# Patient Record
Sex: MALE | Race: OTHER | NOT HISPANIC OR LATINO | URBAN - METROPOLITAN AREA
[De-identification: names, ages, dates, MRNs, and addresses within clinical notes are randomized per-mention and may not be internally consistent; named-entity substitution may affect disease eponyms.]

---

## 2020-04-04 ENCOUNTER — INPATIENT (INPATIENT)
Facility: HOSPITAL | Age: 85
LOS: 2 days | Discharge: NURSING, REHAB OR LONG-TERM CARE | End: 2020-04-07
Attending: INTERNAL MEDICINE | Admitting: INTERNAL MEDICINE
Payer: COMMERCIAL

## 2020-04-04 VITALS
RESPIRATION RATE: 18 BRPM | HEART RATE: 72 BPM | SYSTOLIC BLOOD PRESSURE: 131 MMHG | OXYGEN SATURATION: 97 % | DIASTOLIC BLOOD PRESSURE: 72 MMHG | TEMPERATURE: 99 F

## 2020-04-04 LAB
ALBUMIN SERPL ELPH-MCNC: 3.9 G/DL — SIGNIFICANT CHANGE UP (ref 3.5–5.2)
ALP SERPL-CCNC: 87 U/L — SIGNIFICANT CHANGE UP (ref 30–115)
ALT FLD-CCNC: 18 U/L — SIGNIFICANT CHANGE UP (ref 0–41)
ANION GAP SERPL CALC-SCNC: 15 MMOL/L — HIGH (ref 7–14)
APPEARANCE UR: CLEAR — SIGNIFICANT CHANGE UP
AST SERPL-CCNC: 23 U/L — SIGNIFICANT CHANGE UP (ref 0–41)
BASOPHILS # BLD AUTO: 0.01 K/UL — SIGNIFICANT CHANGE UP (ref 0–0.2)
BASOPHILS NFR BLD AUTO: 0.2 % — SIGNIFICANT CHANGE UP (ref 0–1)
BILIRUB SERPL-MCNC: 0.4 MG/DL — SIGNIFICANT CHANGE UP (ref 0.2–1.2)
BILIRUB UR-MCNC: NEGATIVE — SIGNIFICANT CHANGE UP
BUN SERPL-MCNC: 25 MG/DL — HIGH (ref 10–20)
CALCIUM SERPL-MCNC: 8.2 MG/DL — LOW (ref 8.5–10.1)
CHLORIDE SERPL-SCNC: 101 MMOL/L — SIGNIFICANT CHANGE UP (ref 98–110)
CO2 SERPL-SCNC: 21 MMOL/L — SIGNIFICANT CHANGE UP (ref 17–32)
COLOR SPEC: YELLOW — SIGNIFICANT CHANGE UP
CREAT SERPL-MCNC: 1.6 MG/DL — HIGH (ref 0.7–1.5)
DIFF PNL FLD: SIGNIFICANT CHANGE UP
EOSINOPHIL # BLD AUTO: 0 K/UL — SIGNIFICANT CHANGE UP (ref 0–0.7)
EOSINOPHIL NFR BLD AUTO: 0 % — SIGNIFICANT CHANGE UP (ref 0–8)
GLUCOSE SERPL-MCNC: 126 MG/DL — HIGH (ref 70–99)
GLUCOSE UR QL: NEGATIVE — SIGNIFICANT CHANGE UP
HCT VFR BLD CALC: 38.4 % — LOW (ref 42–52)
HGB BLD-MCNC: 12.9 G/DL — LOW (ref 14–18)
IMM GRANULOCYTES NFR BLD AUTO: 0.2 % — SIGNIFICANT CHANGE UP (ref 0.1–0.3)
KETONES UR-MCNC: NEGATIVE — SIGNIFICANT CHANGE UP
LACTATE SERPL-SCNC: 1.8 MMOL/L — SIGNIFICANT CHANGE UP (ref 0.7–2)
LEUKOCYTE ESTERASE UR-ACNC: NEGATIVE — SIGNIFICANT CHANGE UP
LYMPHOCYTES # BLD AUTO: 0.48 K/UL — LOW (ref 1.2–3.4)
LYMPHOCYTES # BLD AUTO: 11.5 % — LOW (ref 20.5–51.1)
MCHC RBC-ENTMCNC: 30.6 PG — SIGNIFICANT CHANGE UP (ref 27–31)
MCHC RBC-ENTMCNC: 33.6 G/DL — SIGNIFICANT CHANGE UP (ref 32–37)
MCV RBC AUTO: 91 FL — SIGNIFICANT CHANGE UP (ref 80–94)
MONOCYTES # BLD AUTO: 0.45 K/UL — SIGNIFICANT CHANGE UP (ref 0.1–0.6)
MONOCYTES NFR BLD AUTO: 10.8 % — HIGH (ref 1.7–9.3)
NEUTROPHILS # BLD AUTO: 3.22 K/UL — SIGNIFICANT CHANGE UP (ref 1.4–6.5)
NEUTROPHILS NFR BLD AUTO: 77.3 % — HIGH (ref 42.2–75.2)
NITRITE UR-MCNC: NEGATIVE — SIGNIFICANT CHANGE UP
NRBC # BLD: 0 /100 WBCS — SIGNIFICANT CHANGE UP (ref 0–0)
NT-PROBNP SERPL-SCNC: 395 PG/ML — HIGH (ref 0–300)
PH UR: 6 — SIGNIFICANT CHANGE UP (ref 5–8)
PLATELET # BLD AUTO: 130 K/UL — SIGNIFICANT CHANGE UP (ref 130–400)
POTASSIUM SERPL-MCNC: 4 MMOL/L — SIGNIFICANT CHANGE UP (ref 3.5–5)
POTASSIUM SERPL-SCNC: 4 MMOL/L — SIGNIFICANT CHANGE UP (ref 3.5–5)
PROT SERPL-MCNC: 5.9 G/DL — LOW (ref 6–8)
PROT UR-MCNC: ABNORMAL
RBC # BLD: 4.22 M/UL — LOW (ref 4.7–6.1)
RBC # FLD: 13.7 % — SIGNIFICANT CHANGE UP (ref 11.5–14.5)
SODIUM SERPL-SCNC: 137 MMOL/L — SIGNIFICANT CHANGE UP (ref 135–146)
SP GR SPEC: 1.02 — SIGNIFICANT CHANGE UP (ref 1.01–1.02)
TROPONIN T SERPL-MCNC: 0.02 NG/ML — HIGH
UROBILINOGEN FLD QL: SIGNIFICANT CHANGE UP
WBC # BLD: 4.17 K/UL — LOW (ref 4.8–10.8)
WBC # FLD AUTO: 4.17 K/UL — LOW (ref 4.8–10.8)

## 2020-04-04 PROCEDURE — 99285 EMERGENCY DEPT VISIT HI MDM: CPT

## 2020-04-04 PROCEDURE — 93010 ELECTROCARDIOGRAM REPORT: CPT

## 2020-04-04 PROCEDURE — 71045 X-RAY EXAM CHEST 1 VIEW: CPT | Mod: 26

## 2020-04-04 RX ORDER — ATORVASTATIN CALCIUM 80 MG/1
1 TABLET, FILM COATED ORAL
Qty: 0 | Refills: 0 | DISCHARGE

## 2020-04-04 RX ORDER — FLUTICASONE FUROATE AND VILANTEROL TRIFENATATE 100; 25 UG/1; UG/1
1 POWDER RESPIRATORY (INHALATION)
Qty: 0 | Refills: 0 | DISCHARGE

## 2020-04-04 RX ORDER — ACETAMINOPHEN 500 MG
650 TABLET ORAL ONCE
Refills: 0 | Status: COMPLETED | OUTPATIENT
Start: 2020-04-04 | End: 2020-04-04

## 2020-04-04 RX ADMIN — Medication 650 MILLIGRAM(S): at 20:25

## 2020-04-04 NOTE — H&P ADULT - NSHPLABSRESULTS_GEN_ALL_CORE
Complete Blood Count + Automated Diff (04.04.20 @ 20:50)    WBC Count: 4.17 K/uL    RBC Count: 4.22 M/uL    Hemoglobin: 12.9 g/dL    Hematocrit: 38.4 %    Mean Cell Volume: 91.0 fL    Mean Cell Hemoglobin: 30.6 pg    Mean Cell Hemoglobin Conc: 33.6 g/dL    Red Cell Distrib Width: 13.7 %    Platelet Count - Automated: 130 K/uL    Auto Neutrophil #: 3.22 K/uL    Auto Lymphocyte #: 0.48 K/uL    Auto Monocyte #: 0.45 K/uL    Auto Eosinophil #: 0.00 K/uL    Auto Basophil #: 0.01 K/uL    Auto Neutrophil %: 77.3: Differential percentages must be correlated with absolute numbers for  clinical significance. %    Auto Lymphocyte %: 11.5 %    Auto Monocyte %: 10.8 %    Auto Eosinophil %: 0.0 %    Auto Basophil %: 0.2 %    Auto Immature Granulocyte %: 0.2 %    Nucleated RBC: 0 /100 WBCs      Comprehensive Metabolic Panel (04.04.20 @ 20:50)    Sodium, Serum: 137 mmol/L    Potassium, Serum: 4.0 mmol/L    Chloride, Serum: 101 mmol/L    Carbon Dioxide, Serum: 21 mmol/L    Anion Gap, Serum: 15 mmol/L    Blood Urea Nitrogen, Serum: 25 mg/dL    Creatinine, Serum: 1.6 mg/dL    Glucose, Serum: 126 mg/dL    Calcium, Total Serum: 8.2 mg/dL    Protein Total, Serum: 5.9 g/dL    Albumin, Serum: 3.9 g/dL    Bilirubin Total, Serum: 0.4 mg/dL    Alkaline Phosphatase, Serum: 87 U/L    Aspartate Aminotransferase (AST/SGOT): 23 U/L    Alanine Aminotransferase (ALT/SGPT): 18 U/L    eGFR if Non : 37: Interpretative comment  The units for eGFR are mL/min/1.73M2 (normalized body surface area). The  eGFR is calculated from a serum creatinine using the CKD-EPI equation.  Other variables required for calculation are race, age and sex. Among  patients with chronic kidney disease (CKD), the eGFR is useful in  determining the stage of disease according to KDOQI CKD classification.  All eGFR results are reported numerically with the following  interpretation.          GFR                    With                 Without     (ml/min/1.73 m2)    Kidney Damage       Kidney Damage        >= 90                    Stage 1                     Normal        60-89                    Stage 2                     Decreased GFR        30-59     Stage 3                     Stage 3        15-29                    Stage 4                     Stage 4        < 15                      Stage 5                     Stage 5  Each stage of CKD assumes that the associated GFR level has been in  effect for at least 3 months. Determination of stages one and two (with  eGFR > 59 ml/min/m2) requires estimation of kidney damage for at least 3  months as defined by structural or functional abnormalities.  Limitations: All estimates of GFR will be less accurate for patients at  extremes of muscle mass (including but not limited to frail elderly,  critically ill, or cancer patients), those with unusual diets, and those  with conditions associated with reduced secretion or extrarenal  elimination of creatinine. The eGFR equation is not recommended for use  in patients with unstable creatinine levels. mL/min/1.73M2    eGFR if African American: 43 mL/min/1.73M2      < from: Xray Chest 1 View-PORTABLE IMMEDIATE (04.04.20 @ 21:13) >    Impression:      Low lung volumes with bilateral reticular and interstitial opacities.    < end of copied text >

## 2020-04-04 NOTE — ED PROVIDER NOTE - ATTENDING CONTRIBUTION TO CARE
Patient presents with one week h/o cough/congestion, generalized weakness and unable to get up from the bed and ambulate due to generalized weakness, patient lives alone in the assisted living facility, was independent prior to his symptoms one week ago.   vitals noted  lungs: B/L CTA  abd: +BS, NT, ND, soft  A/P: URI with generalized weakness  ambulation failure due to generalized weakness  labs, CXR, EKG  reevaluation

## 2020-04-04 NOTE — H&P ADULT - ATTENDING COMMENTS
A 89 yo male with PMH of HTN, HLD, COPD was sent to ED from senior resident for SOB. Patient reports cough and SOB for the last month. He was transferred from [facility] in [place] to [place] one month ago and since then he feels weak. yesterday he was getting SOB and cough, the staff noticed him unable to complete words from dyspnea so EMS was called.   in the ED patient is hemodynamically stable, SaO2 97% on room air. CXR showed bilateral interstitial infiltrates.     PHYSICAL EXAM:  GENERAL: NAD, well-developed  HEAD:  Atraumatic, Normocephalic  EYES: EOMI, PERRLA, conjunctiva and sclera clear  NECK: Supple, No JVD  CHEST/LUNG: bibasilar rales.   HEART: Regular rate and rhythm; S1 S2  ABDOMEN: Soft, Nontender, Nondistended; Bowel sounds present  EXTREMITIES:  2+ Peripheral Pulses, No clubbing, cyanosis, or edema  PSYCH: AAOx3  NEUROLOGY: non-focal    A/P:   Pneumonia, likely viral, to rule out COVID-19  CXR showed low lung volume, bilateral lower lung reticular and interstitial infiltrates.  No hypoxia.   Start on Plaquenil. QTc 430.  Patient is DNR/DNI    SHADIA:   Cr improved from 1.6 to 1.3  Encourage oral hydration. Hold IV fluid.     COPD: stable, no wheezing    HTN: elevated  Continue Metoprolol. Can resume torsemide tomorrow.   Will give on dose Amlodipine 5mg today.

## 2020-04-04 NOTE — ED PROVIDER NOTE - CARE PLAN
Principal Discharge DX:	Suspected 2019 novel coronavirus infection  Secondary Diagnosis:	Cough  Secondary Diagnosis:	Shortness of breath

## 2020-04-04 NOTE — H&P ADULT - ASSESSMENT
# Cough and reported fever most likely secondary to viral illness, rule out COVID19:  - in the ED patient is hemodynamically stable, SaO2 94$ on 2 L NC  - WBC 4.17, absolute lymphocyte count 0.48, lactate 2.2  - pending COVID PCR  - CXR with bilateral reticular and interstitial opacities.  - pending CRP and procalcitonin  - start hydroxychloroquine, QTC on prior     # HTN: - controlled     dvt ppx: Lovenox  gi ppx: not indicated  DASH-CHO consistent diet  from home 89 year old male w hx of HTN, HLD, COPD - not on home oxygen - is presenting to ED for evaluation of cough and dyspnea.    # Cough and Dyspnea fever most likely secondary to viral illness, rule out COVID19:  - in the ED patient is hemodynamically stable, SaO2 95% on room air   - WBC 4.17, absolute lymphocyte count 0.48, lactate 2.2  - pending COVID PCR  - CXR with bilateral reticular and interstitial opacities.  - pending CRP and procalcitonin    # COPD:  # DLD:  # HTN: - controlled     dvt ppx: Lovenox  gi ppx: not indicated  DASH-CHO consistent diet  from home 89 year old male w hx of HTN, HLD, COPD - not on home oxygen - is presenting to ED for evaluation of cough and dyspnea.    # Cough and Dyspnea fever most likely secondary to viral illness, rule out COVID19:  - in the ED patient is hemodynamically stable, SaO2 95% on room air   - WBC 4.17, absolute lymphocyte count 0.48, lactate 2.2  - pending COVID PCR  - CXR with bilateral reticular and interstitial opacities.  - pending CRP and procalcitonin    # COPD: - c/w Symbicort and montelukast   # DLD: - c/w statin   # HTN: - controlled, c/w  metoprolol  # Glucoma: - c/w eye drops   # GERD: c/w PPI      dvt ppx: Lovenox  gi ppx: PPI  DASH diet  from assisted living facility 89 year old male w hx of HTN, HLD, COPD - not on home oxygen - is presenting to ED for evaluation of cough and dyspnea.    # Cough and Dyspnea fever most likely secondary to viral illness, rule out COVID19:  - in the ED patient is hemodynamically stable, SaO2 95% on room air   - WBC 4.17, absolute lymphocyte count 0.48, lactate 2.2  - pending COVID PCR  - CXR with bilateral reticular and interstitial opacities.  - pending CRP and procalcitonin  - start hydroxychloroquine , follow up EKG for QTC    # COPD: - c/w Symbicort and montelukast   # DLD: - c/w statin   # HTN: - controlled, c/w metoprolol  # Glucoma: - c/w eye drops   # GERD: c/w PPI      * patient will be able to confirm the medication list in the AM, please follow     dvt ppx: Lovenox  gi ppx: PPI  DASH diet  from assisted living facility  DNR/DNI , MOLST in the chart to be signed by the attending 89 year old male w hx of HTN, HLD, COPD - not on home oxygen - is presenting to ED for evaluation of cough and dyspnea.    # Cough and Dyspnea fever most likely secondary to viral illness, rule out COVID19:  - in the ED patient is hemodynamically stable, SaO2 95% on room air   - WBC 4.17, absolute lymphocyte count 0.48, lactate 2.2  - pending COVID PCR  - CXR with bilateral reticular and interstitial opacities.  - pending CRP and procalcitonin  - start hydroxychloroquine , follow up EKG for QTC    # SHADIA vs CKD: - creatinine 1.6 on admission, will give trial of fluid and get urine studies if no improvement   # COPD: - c/w Symbicort and montelukast   # DLD: - c/w statin   # HTN: - controlled, c/w metoprolol  # Glucoma: - c/w eye drops   # GERD: c/w PPI      * patient will be able to confirm the medication list in the AM, please follow     dvt ppx: Lovenox  gi ppx: PPI  DASH diet  from assisted living facility  DNR/DNI , MOLST in the chart to be signed by the attending

## 2020-04-04 NOTE — ED PROVIDER NOTE - OBJECTIVE STATEMENT
89 year old male w hx of HTN, HLD, COPD, former smoker presents to the ED for evaluation of constant, moderate, progressively worsening generalized weakness x 1 month, worse over the last week. Pt states he was previously able to ambulate w/o assistive devices, now is unable to get out of bed w/o assistance. Also endorses 1 week of nonproductive cough and nonbloody watery diarrhea. Denies fevers/chills, URI sx, shortness of breath, wheezing, abd pain, n/v, black/bloody stools, irritative voiding symptoms, recent travel/sick contacts.

## 2020-04-04 NOTE — H&P ADULT - HISTORY OF PRESENT ILLNESS
89 year old male w hx of HTN, HLD, COPD, former smoker presents to the ED for evaluation of constant, moderate, progressively worsening generalized weakness x 1 month, worse over the last week. Pt states he was previously able to ambulate w/o assistive devices, now is unable to get out of bed w/o assistance. Also endorses 1 week of nonproductive cough and nonbloody watery diarrhea. Denies fevers/chills, URI sx, shortness of breath, wheezing, abd pain, n/v, black/bloody stools, irritative voiding symptoms, recent travel/sick contacts 89 year old male w hx of HTN, HLD, COPD - not on home oxygen - is presenting to ED for evaluation of cough and fever. patient endorses it started 5 days ago with fever Tmax 104 F , chills, non productive cough, myalgia and fatigue. patient denies headache, chest pain, palpitations, nausea, vomiting, diarrhea , or change in bowel or urinary habits. denies sick contacts or recent travel.     in the ED patient is hemodynamically stable, SaO2 97% on room air.   patient is being ruled out for VIOD19.  admitted to medicine for further management. 89 year old male w hx of HTN, HLD, COPD - not on home oxygen - is presenting to ED for evaluation of cough and dyspnea. patient endorses it started last week with non productive cough and progressive fatigue. patient denies headache, chest pain, palpitations, nausea, vomiting, diarrhea , or change in bowel or urinary habits. denies sick contacts or recent travel.     in the ED patient is hemodynamically stable, SaO2 97% on room air.   patient is being ruled out for VIOD19.  admitted to medicine for further management.

## 2020-04-04 NOTE — H&P ADULT - NSHPPHYSICALEXAM_GEN_ALL_CORE
GENERAL: NAD, lying in bed comfortably  HEAD:  Atraumatic, Normocephalic  EYES: EOMI, PERRLA, conjunctiva and sclera clear  ENT: Moist mucous membranes  NECK: Supple, No JVD  CHEST/LUNG: Clear to auscultation bilaterally; No rales, rhonchi, wheezing, or rubs. Unlabored respirations  HEART: Regular rate and rhythm; No murmurs, rubs, or gallops  ABDOMEN: Bowel sounds present; Soft, Nontender, Nondistended. No hepatomegally  EXTREMITIES:  2+ Peripheral Pulses, brisk capillary refill. No clubbing, cyanosis, or edema  NERVOUS SYSTEM:  Alert & Oriented X3, speech clear. No deficits   MSK: FROM all 4 extremities, full and equal strength  SKIN: No rashes or lesions GENERAL: NAD, lying in bed comfortably  CHEST/LUNG: Clear to auscultation bilaterally; No rales, rhonchi, wheezing, or rubs.   HEART: Regular rate and rhythm; No murmurs, rubs, or gallops  ABDOMEN: Bowel sounds present; Soft, Nontender, Nondistended.   EXTREMITIES:  2+ Peripheral Pulses, brisk capillary refill. No clubbing, cyanosis, or edema  NERVOUS SYSTEM:  Alert & Oriented X3, speech clear. No deficits

## 2020-04-04 NOTE — ED PROVIDER NOTE - PHYSICAL EXAMINATION
VITALS:  I have reviewed the initial vital signs.  GENERAL: Well-developed, well-nourished elderly male, in no acute distress. Nontoxic.  HEENT: Sclera clear. No conjunctival pallor. EOMI, PERRLA. Mucous membranes moist,.  NECK: supple w FROM. No JVD.  CARDIO: RRR, nl S1 and S2. No murmurs, rubs, or gallops. 2+ radial pulses bilaterally.  PULM: Normal effort. No tachypnea or retractions. No stridor. CTA b/l without wheezes, rales, or rhonchi.  MSK: FROM to extremities x4.  GI: Normal bowel sounds. Abdomen soft and non-distended. Nontender in all four quadrants without rebound or guarding.   : No CVA tenderness b/l.  SKIN: Warm, dry. No pallor. No jaundice.   NEURO: A&Ox3. Speech clear. No gross motor/sensory deficits.

## 2020-04-04 NOTE — ED PROVIDER NOTE - NS ED ROS FT
CONSTITUTIONAL: (-) fevers, (-) chills, (-) malaise, (-) decreased appetite  ENT: (-) congestion, (-) rhinorrhea, (-) sore throat  CARDIO: (-) chest pain, (-) palpitations, (-) edema, (-) cyanosis  PULM: (+) cough, (-) sputum, (-) chest tightness, (-) shortness of breath, (-) wheezing  GI: (-) nausea, (-) vomiting, (+) diarrhea, (-) constipation, (-) abdominal pain, (-) melena, (-) hematochezia, (-) rectal bleeding  : (-) dysuria, (-) hematuria, (-) frequency, (-) urgency, (-) flank pain  MSK: (-) back pain, (-) myalgias, (+) gait difficulty  SKIN: (-) rashes, (-) pallor  NEURO: (-) headache, (-) dizziness, (-) lightheadedness, (+) generalized weakness    *all other systems negative except as documented above and in the HPI*

## 2020-04-05 LAB
ANION GAP SERPL CALC-SCNC: 13 MMOL/L — SIGNIFICANT CHANGE UP (ref 7–14)
BACTERIA # UR AUTO: NEGATIVE — SIGNIFICANT CHANGE UP
BASOPHILS # BLD AUTO: 0.01 K/UL — SIGNIFICANT CHANGE UP (ref 0–0.2)
BASOPHILS NFR BLD AUTO: 0.3 % — SIGNIFICANT CHANGE UP (ref 0–1)
BUN SERPL-MCNC: 24 MG/DL — HIGH (ref 10–20)
CALCIUM SERPL-MCNC: 7.9 MG/DL — LOW (ref 8.5–10.1)
CHLORIDE SERPL-SCNC: 107 MMOL/L — SIGNIFICANT CHANGE UP (ref 98–110)
CO2 SERPL-SCNC: 22 MMOL/L — SIGNIFICANT CHANGE UP (ref 17–32)
CREAT SERPL-MCNC: 1.3 MG/DL — SIGNIFICANT CHANGE UP (ref 0.7–1.5)
EOSINOPHIL # BLD AUTO: 0.01 K/UL — SIGNIFICANT CHANGE UP (ref 0–0.7)
EOSINOPHIL NFR BLD AUTO: 0.3 % — SIGNIFICANT CHANGE UP (ref 0–8)
EPI CELLS # UR: 3 /HPF — SIGNIFICANT CHANGE UP (ref 0–5)
GLUCOSE SERPL-MCNC: 112 MG/DL — HIGH (ref 70–99)
HCT VFR BLD CALC: 38.2 % — LOW (ref 42–52)
HGB BLD-MCNC: 12.9 G/DL — LOW (ref 14–18)
HYALINE CASTS # UR AUTO: 6 /LPF — SIGNIFICANT CHANGE UP (ref 0–7)
IMM GRANULOCYTES NFR BLD AUTO: 0.3 % — SIGNIFICANT CHANGE UP (ref 0.1–0.3)
LYMPHOCYTES # BLD AUTO: 0.51 K/UL — LOW (ref 1.2–3.4)
LYMPHOCYTES # BLD AUTO: 14.6 % — LOW (ref 20.5–51.1)
MAGNESIUM SERPL-MCNC: 2.3 MG/DL — SIGNIFICANT CHANGE UP (ref 1.8–2.4)
MCHC RBC-ENTMCNC: 31.1 PG — HIGH (ref 27–31)
MCHC RBC-ENTMCNC: 33.8 G/DL — SIGNIFICANT CHANGE UP (ref 32–37)
MCV RBC AUTO: 92 FL — SIGNIFICANT CHANGE UP (ref 80–94)
MONOCYTES # BLD AUTO: 0.35 K/UL — SIGNIFICANT CHANGE UP (ref 0.1–0.6)
MONOCYTES NFR BLD AUTO: 10 % — HIGH (ref 1.7–9.3)
NEUTROPHILS # BLD AUTO: 2.61 K/UL — SIGNIFICANT CHANGE UP (ref 1.4–6.5)
NEUTROPHILS NFR BLD AUTO: 74.5 % — SIGNIFICANT CHANGE UP (ref 42.2–75.2)
NRBC # BLD: 0 /100 WBCS — SIGNIFICANT CHANGE UP (ref 0–0)
PLATELET # BLD AUTO: 114 K/UL — LOW (ref 130–400)
POTASSIUM SERPL-MCNC: 4.1 MMOL/L — SIGNIFICANT CHANGE UP (ref 3.5–5)
POTASSIUM SERPL-SCNC: 4.1 MMOL/L — SIGNIFICANT CHANGE UP (ref 3.5–5)
RBC # BLD: 4.15 M/UL — LOW (ref 4.7–6.1)
RBC # FLD: 13.9 % — SIGNIFICANT CHANGE UP (ref 11.5–14.5)
RBC CASTS # UR COMP ASSIST: 9 /HPF — HIGH (ref 0–4)
SARS-COV-2 RNA SPEC QL NAA+PROBE: DETECTED
SODIUM SERPL-SCNC: 142 MMOL/L — SIGNIFICANT CHANGE UP (ref 135–146)
WBC # BLD: 3.5 K/UL — LOW (ref 4.8–10.8)
WBC # FLD AUTO: 3.5 K/UL — LOW (ref 4.8–10.8)
WBC UR QL: 2 /HPF — SIGNIFICANT CHANGE UP (ref 0–5)

## 2020-04-05 PROCEDURE — 99223 1ST HOSP IP/OBS HIGH 75: CPT | Mod: AI

## 2020-04-05 RX ORDER — HYDROXYCHLOROQUINE SULFATE 200 MG
400 TABLET ORAL EVERY 12 HOURS
Refills: 0 | Status: COMPLETED | OUTPATIENT
Start: 2020-04-05 | End: 2020-04-05

## 2020-04-05 RX ORDER — ATORVASTATIN CALCIUM 80 MG/1
40 TABLET, FILM COATED ORAL DAILY
Refills: 0 | Status: DISCONTINUED | OUTPATIENT
Start: 2020-04-05 | End: 2020-04-07

## 2020-04-05 RX ORDER — MONTELUKAST 4 MG/1
1 TABLET, CHEWABLE ORAL
Qty: 0 | Refills: 0 | DISCHARGE

## 2020-04-05 RX ORDER — MONTELUKAST 4 MG/1
10 TABLET, CHEWABLE ORAL DAILY
Refills: 0 | Status: DISCONTINUED | OUTPATIENT
Start: 2020-04-05 | End: 2020-04-07

## 2020-04-05 RX ORDER — LATANOPROST 0.05 MG/ML
1 SOLUTION/ DROPS OPHTHALMIC; TOPICAL
Qty: 0 | Refills: 0 | DISCHARGE

## 2020-04-05 RX ORDER — HYDROXYCHLOROQUINE SULFATE 200 MG
200 TABLET ORAL EVERY 12 HOURS
Refills: 0 | Status: DISCONTINUED | OUTPATIENT
Start: 2020-04-06 | End: 2020-04-07

## 2020-04-05 RX ORDER — BUDESONIDE AND FORMOTEROL FUMARATE DIHYDRATE 160; 4.5 UG/1; UG/1
2 AEROSOL RESPIRATORY (INHALATION)
Refills: 0 | Status: DISCONTINUED | OUTPATIENT
Start: 2020-04-05 | End: 2020-04-07

## 2020-04-05 RX ORDER — PANTOPRAZOLE SODIUM 20 MG/1
40 TABLET, DELAYED RELEASE ORAL
Refills: 0 | Status: DISCONTINUED | OUTPATIENT
Start: 2020-04-05 | End: 2020-04-07

## 2020-04-05 RX ORDER — HEPARIN SODIUM 5000 [USP'U]/ML
5000 INJECTION INTRAVENOUS; SUBCUTANEOUS EVERY 8 HOURS
Refills: 0 | Status: DISCONTINUED | OUTPATIENT
Start: 2020-04-05 | End: 2020-04-07

## 2020-04-05 RX ORDER — METOPROLOL TARTRATE 50 MG
50 TABLET ORAL DAILY
Refills: 0 | Status: DISCONTINUED | OUTPATIENT
Start: 2020-04-05 | End: 2020-04-07

## 2020-04-05 RX ORDER — HYDROXYCHLOROQUINE SULFATE 200 MG
TABLET ORAL
Refills: 0 | Status: DISCONTINUED | OUTPATIENT
Start: 2020-04-05 | End: 2020-04-07

## 2020-04-05 RX ORDER — SODIUM CHLORIDE 9 MG/ML
1000 INJECTION INTRAMUSCULAR; INTRAVENOUS; SUBCUTANEOUS
Refills: 0 | Status: DISCONTINUED | OUTPATIENT
Start: 2020-04-05 | End: 2020-04-06

## 2020-04-05 RX ORDER — PANTOPRAZOLE SODIUM 20 MG/1
1 TABLET, DELAYED RELEASE ORAL
Qty: 0 | Refills: 0 | DISCHARGE

## 2020-04-05 RX ORDER — AMLODIPINE BESYLATE 2.5 MG/1
5 TABLET ORAL DAILY
Refills: 0 | Status: DISCONTINUED | OUTPATIENT
Start: 2020-04-05 | End: 2020-04-07

## 2020-04-05 RX ORDER — LATANOPROST 0.05 MG/ML
1 SOLUTION/ DROPS OPHTHALMIC; TOPICAL AT BEDTIME
Refills: 0 | Status: DISCONTINUED | OUTPATIENT
Start: 2020-04-05 | End: 2020-04-07

## 2020-04-05 RX ORDER — METOPROLOL TARTRATE 50 MG
1 TABLET ORAL
Qty: 0 | Refills: 0 | DISCHARGE

## 2020-04-05 RX ADMIN — Medication 400 MILLIGRAM(S): at 18:35

## 2020-04-05 RX ADMIN — HEPARIN SODIUM 5000 UNIT(S): 5000 INJECTION INTRAVENOUS; SUBCUTANEOUS at 06:12

## 2020-04-05 RX ADMIN — AMLODIPINE BESYLATE 5 MILLIGRAM(S): 2.5 TABLET ORAL at 12:58

## 2020-04-05 RX ADMIN — BUDESONIDE AND FORMOTEROL FUMARATE DIHYDRATE 2 PUFF(S): 160; 4.5 AEROSOL RESPIRATORY (INHALATION) at 08:04

## 2020-04-05 RX ADMIN — HEPARIN SODIUM 5000 UNIT(S): 5000 INJECTION INTRAVENOUS; SUBCUTANEOUS at 12:07

## 2020-04-05 RX ADMIN — PANTOPRAZOLE SODIUM 40 MILLIGRAM(S): 20 TABLET, DELAYED RELEASE ORAL at 06:12

## 2020-04-05 RX ADMIN — MONTELUKAST 10 MILLIGRAM(S): 4 TABLET, CHEWABLE ORAL at 12:27

## 2020-04-05 RX ADMIN — Medication 50 MILLIGRAM(S): at 06:12

## 2020-04-05 RX ADMIN — HEPARIN SODIUM 5000 UNIT(S): 5000 INJECTION INTRAVENOUS; SUBCUTANEOUS at 21:05

## 2020-04-05 RX ADMIN — LATANOPROST 1 DROP(S): 0.05 SOLUTION/ DROPS OPHTHALMIC; TOPICAL at 21:05

## 2020-04-05 RX ADMIN — Medication 400 MILLIGRAM(S): at 02:08

## 2020-04-05 RX ADMIN — ATORVASTATIN CALCIUM 40 MILLIGRAM(S): 80 TABLET, FILM COATED ORAL at 12:27

## 2020-04-05 RX ADMIN — BUDESONIDE AND FORMOTEROL FUMARATE DIHYDRATE 2 PUFF(S): 160; 4.5 AEROSOL RESPIRATORY (INHALATION) at 21:06

## 2020-04-05 RX ADMIN — SODIUM CHLORIDE 75 MILLILITER(S): 9 INJECTION INTRAMUSCULAR; INTRAVENOUS; SUBCUTANEOUS at 02:08

## 2020-04-06 LAB
ALBUMIN SERPL ELPH-MCNC: 3.6 G/DL — SIGNIFICANT CHANGE UP (ref 3.5–5.2)
ALP SERPL-CCNC: 87 U/L — SIGNIFICANT CHANGE UP (ref 30–115)
ALT FLD-CCNC: 17 U/L — SIGNIFICANT CHANGE UP (ref 0–41)
ANION GAP SERPL CALC-SCNC: 14 MMOL/L — SIGNIFICANT CHANGE UP (ref 7–14)
AST SERPL-CCNC: 28 U/L — SIGNIFICANT CHANGE UP (ref 0–41)
BASOPHILS # BLD AUTO: 0 K/UL — SIGNIFICANT CHANGE UP (ref 0–0.2)
BASOPHILS NFR BLD AUTO: 0 % — SIGNIFICANT CHANGE UP (ref 0–1)
BILIRUB SERPL-MCNC: 0.6 MG/DL — SIGNIFICANT CHANGE UP (ref 0.2–1.2)
BUN SERPL-MCNC: 19 MG/DL — SIGNIFICANT CHANGE UP (ref 10–20)
CALCIUM SERPL-MCNC: 8.5 MG/DL — SIGNIFICANT CHANGE UP (ref 8.5–10.1)
CHLORIDE SERPL-SCNC: 105 MMOL/L — SIGNIFICANT CHANGE UP (ref 98–110)
CO2 SERPL-SCNC: 23 MMOL/L — SIGNIFICANT CHANGE UP (ref 17–32)
CREAT SERPL-MCNC: 1.1 MG/DL — SIGNIFICANT CHANGE UP (ref 0.7–1.5)
CRP SERPL-MCNC: 6.64 MG/DL — HIGH (ref 0–0.4)
CULTURE RESULTS: NO GROWTH — SIGNIFICANT CHANGE UP
EOSINOPHIL # BLD AUTO: 0.07 K/UL — SIGNIFICANT CHANGE UP (ref 0–0.7)
EOSINOPHIL NFR BLD AUTO: 2 % — SIGNIFICANT CHANGE UP (ref 0–8)
GLUCOSE SERPL-MCNC: 104 MG/DL — HIGH (ref 70–99)
HCT VFR BLD CALC: 40.1 % — LOW (ref 42–52)
HGB BLD-MCNC: 13.5 G/DL — LOW (ref 14–18)
IMM GRANULOCYTES NFR BLD AUTO: 0.3 % — SIGNIFICANT CHANGE UP (ref 0.1–0.3)
LYMPHOCYTES # BLD AUTO: 0.59 K/UL — LOW (ref 1.2–3.4)
LYMPHOCYTES # BLD AUTO: 16.8 % — LOW (ref 20.5–51.1)
MAGNESIUM SERPL-MCNC: 2.2 MG/DL — SIGNIFICANT CHANGE UP (ref 1.8–2.4)
MCHC RBC-ENTMCNC: 30.6 PG — SIGNIFICANT CHANGE UP (ref 27–31)
MCHC RBC-ENTMCNC: 33.7 G/DL — SIGNIFICANT CHANGE UP (ref 32–37)
MCV RBC AUTO: 90.9 FL — SIGNIFICANT CHANGE UP (ref 80–94)
MONOCYTES # BLD AUTO: 0.33 K/UL — SIGNIFICANT CHANGE UP (ref 0.1–0.6)
MONOCYTES NFR BLD AUTO: 9.4 % — HIGH (ref 1.7–9.3)
NEUTROPHILS # BLD AUTO: 2.52 K/UL — SIGNIFICANT CHANGE UP (ref 1.4–6.5)
NEUTROPHILS NFR BLD AUTO: 71.5 % — SIGNIFICANT CHANGE UP (ref 42.2–75.2)
NRBC # BLD: 0 /100 WBCS — SIGNIFICANT CHANGE UP (ref 0–0)
PLATELET # BLD AUTO: 142 K/UL — SIGNIFICANT CHANGE UP (ref 130–400)
POTASSIUM SERPL-MCNC: 4.2 MMOL/L — SIGNIFICANT CHANGE UP (ref 3.5–5)
POTASSIUM SERPL-SCNC: 4.2 MMOL/L — SIGNIFICANT CHANGE UP (ref 3.5–5)
PROCALCITONIN SERPL-MCNC: 0.05 NG/ML — SIGNIFICANT CHANGE UP (ref 0.02–0.1)
PROT SERPL-MCNC: 6 G/DL — SIGNIFICANT CHANGE UP (ref 6–8)
RBC # BLD: 4.41 M/UL — LOW (ref 4.7–6.1)
RBC # FLD: 13.7 % — SIGNIFICANT CHANGE UP (ref 11.5–14.5)
SODIUM SERPL-SCNC: 142 MMOL/L — SIGNIFICANT CHANGE UP (ref 135–146)
SPECIMEN SOURCE: SIGNIFICANT CHANGE UP
WBC # BLD: 3.52 K/UL — LOW (ref 4.8–10.8)
WBC # FLD AUTO: 3.52 K/UL — LOW (ref 4.8–10.8)

## 2020-04-06 PROCEDURE — 99233 SBSQ HOSP IP/OBS HIGH 50: CPT

## 2020-04-06 RX ORDER — AMLODIPINE BESYLATE 2.5 MG/1
1 TABLET ORAL
Qty: 30 | Refills: 0
Start: 2020-04-06 | End: 2020-05-05

## 2020-04-06 RX ORDER — ACETAMINOPHEN 500 MG
650 TABLET ORAL EVERY 6 HOURS
Refills: 0 | Status: DISCONTINUED | OUTPATIENT
Start: 2020-04-06 | End: 2020-04-07

## 2020-04-06 RX ORDER — HYDROXYCHLOROQUINE SULFATE 200 MG
1 TABLET ORAL
Qty: 8 | Refills: 0
Start: 2020-04-06 | End: 2020-04-09

## 2020-04-06 RX ADMIN — AMLODIPINE BESYLATE 5 MILLIGRAM(S): 2.5 TABLET ORAL at 06:36

## 2020-04-06 RX ADMIN — Medication 50 MILLIGRAM(S): at 06:36

## 2020-04-06 RX ADMIN — Medication 200 MILLIGRAM(S): at 06:36

## 2020-04-06 RX ADMIN — ATORVASTATIN CALCIUM 40 MILLIGRAM(S): 80 TABLET, FILM COATED ORAL at 13:02

## 2020-04-06 RX ADMIN — MONTELUKAST 10 MILLIGRAM(S): 4 TABLET, CHEWABLE ORAL at 13:01

## 2020-04-06 RX ADMIN — Medication 650 MILLIGRAM(S): at 20:11

## 2020-04-06 RX ADMIN — BUDESONIDE AND FORMOTEROL FUMARATE DIHYDRATE 2 PUFF(S): 160; 4.5 AEROSOL RESPIRATORY (INHALATION) at 20:28

## 2020-04-06 RX ADMIN — PANTOPRAZOLE SODIUM 40 MILLIGRAM(S): 20 TABLET, DELAYED RELEASE ORAL at 06:37

## 2020-04-06 RX ADMIN — HEPARIN SODIUM 5000 UNIT(S): 5000 INJECTION INTRAVENOUS; SUBCUTANEOUS at 13:01

## 2020-04-06 RX ADMIN — Medication 0.5 MILLIGRAM(S): at 01:01

## 2020-04-06 RX ADMIN — BUDESONIDE AND FORMOTEROL FUMARATE DIHYDRATE 2 PUFF(S): 160; 4.5 AEROSOL RESPIRATORY (INHALATION) at 09:39

## 2020-04-06 RX ADMIN — HEPARIN SODIUM 5000 UNIT(S): 5000 INJECTION INTRAVENOUS; SUBCUTANEOUS at 06:36

## 2020-04-06 RX ADMIN — Medication 200 MILLIGRAM(S): at 17:14

## 2020-04-06 RX ADMIN — Medication 650 MILLIGRAM(S): at 09:38

## 2020-04-06 NOTE — PROGRESS NOTE ADULT - ASSESSMENT
A 89 yo male with PMH of HTN, HLD, COPD was sent to ED from senior resident for SOB. Patient reports cough and SOB for the last month. He was transferred from [facility] in [place] to [place] one month ago and since then he feels weak, yesterday he was getting SOB and cough, the staff noticed him unable to complete words from dyspnea so EMS was called.   in the ED patient is hemodynamically stable, SaO2 97% on room air. CXR showed bilateral interstitial infiltrates.     A/P:   Pneumonia, likely viral, to rule out COVID-19  CXR showed low lung volume, bilateral lower lung reticular and interstitial infiltrates.  No hypoxia.   Continue Plaquenil. QTc 430.  Patient is DNR/DNI    SHADIA:   Cr improved from 1.6 to 1.3  Encourage oral hydration. Hold IV fluid.     COPD: stable, Continue Symbicort and Singular.     HTN: elevated  Continue Metoprolol. Can resume torsemide.  I added Amlodipine 5mg daily.      #Progress Note Handoff:  Pending (specify):  Improving Hypoxia  Family discussion:  Disposition: Home possible today.

## 2020-04-06 NOTE — DISCHARGE NOTE PROVIDER - HOSPITAL COURSE
89 year old male w hx of HTN, HLD, COPD - not on home oxygen - is presenting to ED for evaluation of cough and dyspnea. patient endorses it started last week with non productive cough and progressive fatigue. patient denies headache, chest pain, palpitations, nausea, vomiting, diarrhea , or change in bowel or urinary habits. denies sick contacts or recent travel. In the ED patient is hemodynamically stable, SaO2 97% on room air. Admitted to medicine for further management. (04 Apr 2020 23:17)        Pt is admitted for cough, Dyspnea and Fever secondary to COVID19.  Sepsis is ruled out on admission. In the ED patient is hemodynamically stable, SaO2 95% on room air. COVID PCR positive w/ CXR with bilateral reticular and interstitial opacities. Pt is stable to be discharged to nursing home with home O2. Pt is to continue hydroxychloroquine (to end on 4/9/20), EKG on admission - . Pt also has SHADIA on admission w/ creatinine of 1.6 on admission, improved s/p IVF. Pt is asked to follow up with primary care team in 1-2 weeks.

## 2020-04-06 NOTE — DISCHARGE NOTE PROVIDER - NSDCCPCAREPLAN_GEN_ALL_CORE_FT
PRINCIPAL DISCHARGE DIAGNOSIS  Diagnosis: COVID-19  Assessment and Plan of Treatment: Stable. Medical management. You were admitted to the hospital because of COVID19 infection. You developed pneumonia from the infection and will benefit home oxygen to maintain SpO2 > 88%. Please take your medications as prescribed. Please continue your self-quarantine for 14 days. Please follow up with your primary care doctor in 14 days.      SECONDARY DISCHARGE DIAGNOSES  Diagnosis: SHADIA (acute kidney injury)  Assessment and Plan of Treatment: Stable. Medical management. You developed acute kidney injury likely from dehydration. It has now resolved from IV fluid. Please take your medications as prescribed. Please follow up with your primary care doctor in 14 days to reassess your kidney function.

## 2020-04-06 NOTE — DISCHARGE NOTE PROVIDER - NSDCCONDITION_GEN_ALL_CORE
Detail Level: Zone
Quality 226: Preventive Care And Screening: Tobacco Use: Screening And Cessation Intervention: Tobacco Screening not Performed for Unknown Reasons
Quality 131: Pain Assessment And Follow-Up: Pain assessment NOT documented as being performed, documentation the patient is not eligible for a pain assessment using a standardized tool
Quality 110: Preventive Care And Screening: Influenza Immunization: Influenza Immunization Administered during Influenza season
Quality 431: Preventive Care And Screening: Unhealthy Alcohol Use - Screening: Patient screened for unhealthy alcohol use using a single question and scores less than 2 times per year
Stable

## 2020-04-06 NOTE — ED ADULT NURSE REASSESSMENT NOTE - NS ED NURSE REASSESS COMMENT FT1
received pt from previous shift RN , pt confused , frequent orientation done , frequent nursing rounds done , provided safety , fall precautions applied , fall health teachings done , no labored breathing this time , no grimaced face noted , no vomiting , for transfer to nursing home as ordered , awaiting for transport

## 2020-04-06 NOTE — PROGRESS NOTE ADULT - ASSESSMENT
89 year old male w hx of HTN, HLD, COPD - not on home oxygen - is presenting to ED for evaluation of cough and dyspnea.    # Cough, Dyspnea and Fever secondary to COVID19   - no sepsis on admission   - in the ED patient is hemodynamically stable, SaO2 95% on room air   - COVID PCR positive   - CXR with bilateral reticular and interstitial opacities.  - pending CRP and procalcitonin result from 4/5/20  - c/w hydroxychloroquine (to end on 4/9/20), EKG on admission -     # SHADIA vs CKD: - creatinine 1.6 on admission, improved s/p IVF   # COPD: - c/w Symbicort and montelukast   # DLD: - c/w statin   # HTN: - controlled, c/w metoprolol and amlodipine   # Glucoma: - c/w eye drops   # GERD: c/w PPI      DVT ppx: Heparin 5000 q8h   GI ppx: PPI  Diet: DASH diet  Disposition: from assisted living facility  Goal status: DNR/DNI

## 2020-04-06 NOTE — DISCHARGE NOTE PROVIDER - NSDCMRMEDTOKEN_GEN_ALL_CORE_FT
atorvastatin 40 mg oral tablet: 1 tab(s) orally once a day  Breo Ellipta 200 mcg-25 mcg/inh inhalation powder: 1 puff(s) inhaled once a day  latanoprost 0.005% ophthalmic solution: 1 drop(s) to each affected eye once a day (in the evening)  metoprolol succinate 50 mg oral tablet, extended release: 1 tab(s) orally once a day  montelukast 10 mg oral tablet: 1 tab(s) orally once a day  pantoprazole 40 mg oral delayed release tablet: 1 tab(s) orally once a day

## 2020-04-06 NOTE — PROGRESS NOTE ADULT - SUBJECTIVE AND OBJECTIVE BOX
SARA MOHR  89y  Male      Patient is a 89y old  Male who presents with a chief complaint of SOB (2020 08:22)      INTERVAL HPI/OVERNIGHT EVENTS:  He feels ok, SOB improved.   Vital Signs Last 24 Hrs  T(C): 38.3 (2020 08:25), Max: 38.3 (2020 08:25)  T(F): 101 (2020 08:25), Max: 101 (2020 08:25)  HR: 81 (2020 11:09) (81 - 90)  BP: 128/66 (2020 11:09) (128/66 - 186/86)  BP(mean): --  RR: 18 (2020 11:09) (18 - 22)  SpO2: 94% (2020 11:09) (92% - 99%)      20 @ 07:01  -  20 @ 07:00  --------------------------------------------------------  IN: 120 mL / OUT: 300 mL / NET: -180 mL            Consultant(s) Notes Reviewed:  [x ] YES  [ ] NO          MEDICATIONS  (STANDING):  amLODIPine   Tablet 5 milliGRAM(s) Oral daily  atorvastatin Oral Tab/Cap - Peds 40 milliGRAM(s) Oral daily  budesonide 160 MICROgram(s)/formoterol 4.5 MICROgram(s) Inhaler 2 Puff(s) Inhalation two times a day  heparin  Injectable 5000 Unit(s) SubCutaneous every 8 hours  hydroxychloroquine   Oral   hydroxychloroquine 200 milliGRAM(s) Oral every 12 hours  latanoprost 0.005% Ophthalmic Solution 1 Drop(s) Both EYES at bedtime  metoprolol succinate ER 50 milliGRAM(s) Oral daily  montelukast 10 milliGRAM(s) Oral daily  pantoprazole    Tablet 40 milliGRAM(s) Oral before breakfast    MEDICATIONS  (PRN):  acetaminophen   Tablet .. 650 milliGRAM(s) Oral every 6 hours PRN Temp greater or equal to 38C (100.4F)      LABS                          12.9   3.50  )-----------( 114      ( 2020 06:42 )             38.2         142  |  107  |  24<H>  ----------------------------<  112<H>  4.1   |  22  |  1.3    Ca    7.9<L>      2020 06:42  Mg     2.3         TPro  5.9<L>  /  Alb  3.9  /  TBili  0.4  /  DBili  x   /  AST  23  /  ALT  18  /  AlkPhos  87        Urinalysis Basic - ( 2020 23:34 )    Color: Yellow / Appearance: Clear / S.023 / pH: x  Gluc: x / Ketone: Negative  / Bili: Negative / Urobili: <2 mg/dL   Blood: x / Protein: 30 mg/dL / Nitrite: Negative   Leuk Esterase: Negative / RBC: 9 /HPF / WBC 2 /HPF   Sq Epi: x / Non Sq Epi: 3 /HPF / Bacteria: Negative        Lactate Trend   @ 20:50 Lactate:1.8     CARDIAC MARKERS ( 2020 20:50 )  x     / 0.02 ng/mL / x     / x     / x          CAPILLARY BLOOD GLUCOSE          Culture - Urine (collected 20 @ 23:34)  Source: .Urine Clean Catch (Midstream)  Final Report (20 @ 08:59):    No growth        RADIOLOGY & ADDITIONAL TESTS:    Imaging Personally Reviewed:  [ ] YES  [ ] NO    HEALTH ISSUES - PROBLEM Dx:        PHYSICAL EXAM:  GENERAL: NAD, well-developed  HEAD:  Atraumatic, Normocephalic  EYES: EOMI, PERRLA, conjunctiva and sclera clear  NECK: Supple, No JVD  CHEST/LUNG: bibasilar rales.   HEART: Regular rate and rhythm; S1 S2  ABDOMEN: Soft, Nontender, Nondistended; Bowel sounds present  EXTREMITIES:  2+ Peripheral Pulses, No clubbing, cyanosis, or edema  PSYCH: AAOx3  NEUROLOGY: non-focal

## 2020-04-06 NOTE — DISCHARGE NOTE PROVIDER - CARE PROVIDER_API CALL
Kerwin Thurman (DO)  [facility]  [address] [place] [address]  Phone: [phone]  Fax: [phone]  Follow Up Time: 2 weeks

## 2020-04-06 NOTE — ED ADULT NURSE REASSESSMENT NOTE - NS ED NURSE REASSESS COMMENT FT1
Pt confused intermittently. MD [phone] made aware. Ativan PO ordered. Fall precautions maintained. BA in place. No emergent s/s of distress noted.

## 2020-04-06 NOTE — PROGRESS NOTE ADULT - SUBJECTIVE AND OBJECTIVE BOX
SARA MOHR 89y Male  MRN#: [id number]     SUBJECTIVE  Patient is a 89y old Male who presents with a chief complaint of SOB.	    Today is hospital day 2d, and this morning he is lying in bed without distress. On RA w/ SpO2 of 95%. Febrile of 101 today.   No acute overnight events.     OBJECTIVE  PAST MEDICAL & SURGICAL HISTORY    ALLERGIES:  No Known Allergies    MEDICATIONS:  STANDING MEDICATIONS  amLODIPine   Tablet 5 milliGRAM(s) Oral daily  atorvastatin Oral Tab/Cap - Peds 40 milliGRAM(s) Oral daily  budesonide 160 MICROgram(s)/formoterol 4.5 MICROgram(s) Inhaler 2 Puff(s) Inhalation two times a day  heparin  Injectable 5000 Unit(s) SubCutaneous every 8 hours  hydroxychloroquine   Oral   hydroxychloroquine 200 milliGRAM(s) Oral every 12 hours  latanoprost 0.005% Ophthalmic Solution 1 Drop(s) Both EYES at bedtime  metoprolol succinate ER 50 milliGRAM(s) Oral daily  montelukast 10 milliGRAM(s) Oral daily  pantoprazole    Tablet 40 milliGRAM(s) Oral before breakfast    PRN MEDICATIONS    HOME MEDICATIONS  Home Medications:  atorvastatin 40 mg oral tablet: 1 tab(s) orally once a day (2020 23:59)  Breo Ellipta 200 mcg-25 mcg/inh inhalation powder: 1 puff(s) inhaled once a day (2020 23:59)  latanoprost 0.005% ophthalmic solution: 1 drop(s) to each affected eye once a day (in the evening) (2020 00:00)  metoprolol succinate 50 mg oral tablet, extended release: 1 tab(s) orally once a day (2020 00:00)  montelukast 10 mg oral tablet: 1 tab(s) orally once a day (2020 00:00)  pantoprazole 40 mg oral delayed release tablet: 1 tab(s) orally once a day (2020 00:01)  torsemide 20 mg oral tablet: 1 tab(s) orally once a day (2020 00:01)      VITAL SIGNS: Last 24 Hours  T(C): 36.2 (2020 21:54), Max: 37.8 (2020 16:41)  T(F): 97.2 (2020 21:54), Max: 100 (2020 16:41)  HR: 87 (2020 05:32) (87 - 90)  BP: 173/3 (2020 05:32) (139/90 - 186/86)  BP(mean): --  RR: 20 (2020 05:32) (20 - 22)  SpO2: 99% (2020 05:32) (92% - 99%)    20 @ 07:01  -  - @ 07:00  --------------------------------------------------------  IN: 120 mL / OUT: 300 mL / NET: -180 mL        LABS:                        12.9   3.50  )-----------( 114      ( 2020 06:42 )             38.2     04-05    142  |  107  |  24<H>  ----------------------------<  112<H>  4.1   |  22  |  1.3    Ca    7.9<L>      2020 06:42  Mg     2.3     04-05    TPro  5.9<L>  /  Alb  3.9  /  TBili  0.4  /  DBili  x   /  AST  23  /  ALT  18  /  AlkPhos  87  04-04    LIVER FUNCTIONS - ( 2020 20:50 )  Alb: 3.9 g/dL / Pro: 5.9 g/dL / ALK PHOS: 87 U/L / ALT: 18 U/L / AST: 23 U/L / GGT: x             Urinalysis Basic - ( 2020 23:34 )    Color: Yellow / Appearance: Clear / S.023 / pH: x  Gluc: x / Ketone: Negative  / Bili: Negative / Urobili: <2 mg/dL   Blood: x / Protein: 30 mg/dL / Nitrite: Negative   Leuk Esterase: Negative / RBC: 9 /HPF / WBC 2 /HPF   Sq Epi: x / Non Sq Epi: 3 /HPF / Bacteria: Negative            CARDIAC MARKERS ( 2020 20:50 )  x     / 0.02 ng/mL / x     / x     / x          CAPILLARY BLOOD GLUCOSE          RADIOLOGY:    < from: Xray Chest 1 View-PORTABLE IMMEDIATE (20 @ 21:13) >  Impression:    Low lung volumes with bilateral reticular and interstitial opacities.  < end of copied text >      PHYSICAL EXAM:  GENERAL: NAD, AAO x 4, 89y M with difficulty hearing  HEAD:  Atraumatic, Normocephalic  EYES: EOMI, conjunctiva clear and sclera white  NECK: Supple, No JVD  CHEST/LUNG: On RA; No accessory muscles used  HEART: Regular rate and rhythm; No murmurs;   ABDOMEN: Soft, Nontender, Nondistended; No guarding  EXTREMITIES:  2+ Peripheral Pulses, No cyanosis or edema  NEUROLOGY: non-focal    ADMISSION SUMMARY  Patient is a 89y old Male who presents with a chief complaint of

## 2020-04-07 VITALS
OXYGEN SATURATION: 95 % | TEMPERATURE: 99 F | SYSTOLIC BLOOD PRESSURE: 129 MMHG | HEART RATE: 80 BPM | DIASTOLIC BLOOD PRESSURE: 73 MMHG | RESPIRATION RATE: 16 BRPM

## 2020-04-07 LAB
ALBUMIN SERPL ELPH-MCNC: 3.5 G/DL — SIGNIFICANT CHANGE UP (ref 3.5–5.2)
ALP SERPL-CCNC: 82 U/L — SIGNIFICANT CHANGE UP (ref 30–115)
ALT FLD-CCNC: 17 U/L — SIGNIFICANT CHANGE UP (ref 0–41)
ANION GAP SERPL CALC-SCNC: 13 MMOL/L — SIGNIFICANT CHANGE UP (ref 7–14)
AST SERPL-CCNC: 25 U/L — SIGNIFICANT CHANGE UP (ref 0–41)
BASOPHILS # BLD AUTO: 0.01 K/UL — SIGNIFICANT CHANGE UP (ref 0–0.2)
BASOPHILS NFR BLD AUTO: 0.2 % — SIGNIFICANT CHANGE UP (ref 0–1)
BILIRUB SERPL-MCNC: 0.5 MG/DL — SIGNIFICANT CHANGE UP (ref 0.2–1.2)
BUN SERPL-MCNC: 22 MG/DL — HIGH (ref 10–20)
CALCIUM SERPL-MCNC: 8.2 MG/DL — LOW (ref 8.5–10.1)
CHLORIDE SERPL-SCNC: 110 MMOL/L — SIGNIFICANT CHANGE UP (ref 98–110)
CO2 SERPL-SCNC: 22 MMOL/L — SIGNIFICANT CHANGE UP (ref 17–32)
CREAT SERPL-MCNC: 1.2 MG/DL — SIGNIFICANT CHANGE UP (ref 0.7–1.5)
EOSINOPHIL # BLD AUTO: 0.03 K/UL — SIGNIFICANT CHANGE UP (ref 0–0.7)
EOSINOPHIL NFR BLD AUTO: 0.7 % — SIGNIFICANT CHANGE UP (ref 0–8)
GLUCOSE SERPL-MCNC: 102 MG/DL — HIGH (ref 70–99)
HCT VFR BLD CALC: 38.3 % — LOW (ref 42–52)
HGB BLD-MCNC: 12.6 G/DL — LOW (ref 14–18)
IMM GRANULOCYTES NFR BLD AUTO: 0.2 % — SIGNIFICANT CHANGE UP (ref 0.1–0.3)
LYMPHOCYTES # BLD AUTO: 0.65 K/UL — LOW (ref 1.2–3.4)
LYMPHOCYTES # BLD AUTO: 16 % — LOW (ref 20.5–51.1)
MAGNESIUM SERPL-MCNC: 2.3 MG/DL — SIGNIFICANT CHANGE UP (ref 1.8–2.4)
MCHC RBC-ENTMCNC: 29.7 PG — SIGNIFICANT CHANGE UP (ref 27–31)
MCHC RBC-ENTMCNC: 32.9 G/DL — SIGNIFICANT CHANGE UP (ref 32–37)
MCV RBC AUTO: 90.3 FL — SIGNIFICANT CHANGE UP (ref 80–94)
MONOCYTES # BLD AUTO: 0.38 K/UL — SIGNIFICANT CHANGE UP (ref 0.1–0.6)
MONOCYTES NFR BLD AUTO: 9.4 % — HIGH (ref 1.7–9.3)
NEUTROPHILS # BLD AUTO: 2.97 K/UL — SIGNIFICANT CHANGE UP (ref 1.4–6.5)
NEUTROPHILS NFR BLD AUTO: 73.5 % — SIGNIFICANT CHANGE UP (ref 42.2–75.2)
NRBC # BLD: 0 /100 WBCS — SIGNIFICANT CHANGE UP (ref 0–0)
PLATELET # BLD AUTO: 148 K/UL — SIGNIFICANT CHANGE UP (ref 130–400)
POTASSIUM SERPL-MCNC: 4.4 MMOL/L — SIGNIFICANT CHANGE UP (ref 3.5–5)
POTASSIUM SERPL-SCNC: 4.4 MMOL/L — SIGNIFICANT CHANGE UP (ref 3.5–5)
PROT SERPL-MCNC: 5.7 G/DL — LOW (ref 6–8)
RBC # BLD: 4.24 M/UL — LOW (ref 4.7–6.1)
RBC # FLD: 13.6 % — SIGNIFICANT CHANGE UP (ref 11.5–14.5)
SODIUM SERPL-SCNC: 145 MMOL/L — SIGNIFICANT CHANGE UP (ref 135–146)
WBC # BLD: 4.05 K/UL — LOW (ref 4.8–10.8)
WBC # FLD AUTO: 4.05 K/UL — LOW (ref 4.8–10.8)

## 2020-04-07 PROCEDURE — 99239 HOSP IP/OBS DSCHRG MGMT >30: CPT

## 2020-04-07 RX ADMIN — AMLODIPINE BESYLATE 5 MILLIGRAM(S): 2.5 TABLET ORAL at 06:45

## 2020-04-07 RX ADMIN — HEPARIN SODIUM 5000 UNIT(S): 5000 INJECTION INTRAVENOUS; SUBCUTANEOUS at 06:45

## 2020-04-07 RX ADMIN — Medication 50 MILLIGRAM(S): at 06:46

## 2020-04-07 RX ADMIN — BUDESONIDE AND FORMOTEROL FUMARATE DIHYDRATE 2 PUFF(S): 160; 4.5 AEROSOL RESPIRATORY (INHALATION) at 07:39

## 2020-04-07 RX ADMIN — PANTOPRAZOLE SODIUM 40 MILLIGRAM(S): 20 TABLET, DELAYED RELEASE ORAL at 06:46

## 2020-04-07 RX ADMIN — Medication 200 MILLIGRAM(S): at 06:51

## 2020-04-07 NOTE — PROGRESS NOTE ADULT - ASSESSMENT
89 year old male w hx of HTN, HLD, COPD - not on home oxygen - is presenting to ED for evaluation of cough and dyspnea.    # Cough, Dyspnea and Fever secondary to COVID19   - no sepsis on admission   - in the ED patient is hemodynamically stable, SaO2 95% on room air   - COVID PCR positive   - CXR with bilateral reticular and interstitial opacities.  - pending CRP and procalcitonin result from 4/5/20  - c/w hydroxychloroquine (to end on 4/9/20), EKG on admission -     # SHADIA: - creatinine 1.6 on admission, improved s/p IVF   # COPD: - c/w Symbicort and montelukast   # DLD: - c/w statin   # HTN: - controlled, c/w metoprolol and amlodipine   # Glucoma: - c/w eye drops   # GERD: c/w PPI      DVT ppx: Heparin 5000 q8h   GI ppx: PPI  Diet: DASH diet  Disposition: from assisted living facility, to be discharged to NH today  Goal status: DNR/DNI

## 2020-04-07 NOTE — ED ADULT NURSE REASSESSMENT NOTE - NS ED NURSE REASSESS COMMENT FT1
pt still awaiting for transport to nursing home , follow up   for transport , pt afebrile , confused , frequent orientation done , incontinence , bed linen changed done , fall precaution applied , fall alarm on , fall health teachings done , call light within reached , v/s done , no labored breathing noted

## 2020-04-07 NOTE — PROGRESS NOTE ADULT - ASSESSMENT
A 89 yo male with PMH of HTN, HLD, COPD was sent to ED from senior resident for SOB. Patient reports cough and SOB for the last month. He was transferred from [facility] in [place] to [place] one month ago and since then he feels weak, yesterday he was getting SOB and cough, the staff noticed him unable to complete words from dyspnea so EMS was called.   in the ED patient is hemodynamically stable, SaO2 97% on room air. CXR showed bilateral interstitial infiltrates.     A/P:   Pneumonia, likely viral, to rule out COVID-19  CXR showed low lung volume, bilateral lower lung reticular and interstitial infiltrates.  No hypoxia.   Continue Plaquenil. QTc 430.  Patient is DNR/DNI    SHADIA:   Cr improved from 1.6 to 1.2  Encourage oral hydration.    COPD: stable, Continue Symbicort and Singular.     HTN: elevated  Continue Metoprolol. Can resume torsemide.  I added Amlodipine 5mg daily.      #Progress Note Handoff:  Pending (specify):  Improving Hypoxia  Family discussion:  Disposition: Home possible today.

## 2020-04-07 NOTE — PROGRESS NOTE ADULT - SUBJECTIVE AND OBJECTIVE BOX
SARA MOHR 89y Male  MRN#: [id number]     SUBJECTIVE  Patient is a 89y old Male who presents with a chief complaint of Fever and chill (06 Apr 2020 17:38)    Today is hospital day 3d, and this morning he is lying in bed without distress. Pt was supposed to be discharged yesterday but due to transportation problem, pt will be discharged today.     No acute overnight events.     OBJECTIVE  PAST MEDICAL & SURGICAL HISTORY    ALLERGIES:  No Known Allergies    MEDICATIONS:  STANDING MEDICATIONS  amLODIPine   Tablet 5 milliGRAM(s) Oral daily  atorvastatin Oral Tab/Cap - Peds 40 milliGRAM(s) Oral daily  budesonide 160 MICROgram(s)/formoterol 4.5 MICROgram(s) Inhaler 2 Puff(s) Inhalation two times a day  heparin  Injectable 5000 Unit(s) SubCutaneous every 8 hours  hydroxychloroquine   Oral   hydroxychloroquine 200 milliGRAM(s) Oral every 12 hours  latanoprost 0.005% Ophthalmic Solution 1 Drop(s) Both EYES at bedtime  metoprolol succinate ER 50 milliGRAM(s) Oral daily  montelukast 10 milliGRAM(s) Oral daily  pantoprazole    Tablet 40 milliGRAM(s) Oral before breakfast    PRN MEDICATIONS  acetaminophen   Tablet .. 650 milliGRAM(s) Oral every 6 hours PRN    HOME MEDICATIONS  Home Medications:  atorvastatin 40 mg oral tablet: 1 tab(s) orally once a day (04 Apr 2020 23:59)  Breo Ellipta 200 mcg-25 mcg/inh inhalation powder: 1 puff(s) inhaled once a day (04 Apr 2020 23:59)  latanoprost 0.005% ophthalmic solution: 1 drop(s) to each affected eye once a day (in the evening) (05 Apr 2020 00:00)  metoprolol succinate 50 mg oral tablet, extended release: 1 tab(s) orally once a day (05 Apr 2020 00:00)  montelukast 10 mg oral tablet: 1 tab(s) orally once a day (05 Apr 2020 00:00)  pantoprazole 40 mg oral delayed release tablet: 1 tab(s) orally once a day (05 Apr 2020 00:01)    VITAL SIGNS: Last 24 Hours  T(C): 37 (07 Apr 2020 08:09), Max: 38.3 (06 Apr 2020 08:25)  T(F): 98.6 (07 Apr 2020 08:09), Max: 101 (06 Apr 2020 08:25)  HR: 80 (07 Apr 2020 08:09) (78 - 98)  BP: 129/73 (07 Apr 2020 08:09) (128/66 - 182/93)  BP(mean): --  RR: 16 (07 Apr 2020 08:09) (16 - 19)  SpO2: 95% (07 Apr 2020 08:09) (94% - 98%)      LABS:                        12.6   4.05  )-----------( 148      ( 07 Apr 2020 07:10 )             38.3     04-07    145  |  110  |  22<H>  ----------------------------<  102<H>  4.4   |  22  |  1.2    Ca    8.2<L>      07 Apr 2020 07:10  Mg     2.3     04-07    TPro  5.7<L>  /  Alb  3.5  /  TBili  0.5  /  DBili  x   /  AST  25  /  ALT  17  /  AlkPhos  82  04-07    LIVER FUNCTIONS - ( 07 Apr 2020 07:10 )  Alb: 3.5 g/dL / Pro: 5.7 g/dL / ALK PHOS: 82 U/L / ALT: 17 U/L / AST: 25 U/L / GGT: x             Culture - Urine (collected 04 Apr 2020 23:34)  Source: .Urine Clean Catch (Midstream)  Final Report (06 Apr 2020 08:59):    No growth      CAPILLARY BLOOD GLUCOSE        RADIOLOGY:        PHYSICAL EXAM:  GENERAL: NAD, AAO x 4, 89y M with difficulty hearing  HEAD:  Atraumatic, Normocephalic  EYES: EOMI, conjunctiva clear and sclera white  NECK: Supple, No JVD  CHEST/LUNG: On RA; No accessory muscles used  HEART: Regular rate and rhythm; No murmurs;   ABDOMEN: Soft, Nontender, Nondistended; No guarding  EXTREMITIES:  2+ Peripheral Pulses, No cyanosis or edema  NEUROLOGY: non-focal      ADMISSION SUMMARY  Patient is a 89y old Male who presents with a chief complaint of Fever and chill (06 Apr 2020 17:38)

## 2020-04-07 NOTE — PROGRESS NOTE ADULT - SUBJECTIVE AND OBJECTIVE BOX
SARA MOHR  89y  Male      Patient is a 89y old  Male who presents with a chief complaint of Fever and chill (06 Apr 2020 17:38)      INTERVAL HPI/OVERNIGHT EVENTS:  He feels ok, SOB improved.   Vital Signs Last 24 Hrs  T(C): 37 (07 Apr 2020 08:09), Max: 37.9 (06 Apr 2020 20:25)  T(F): 98.6 (07 Apr 2020 08:09), Max: 100.2 (06 Apr 2020 20:25)  HR: 80 (07 Apr 2020 08:09) (78 - 98)  BP: 129/73 (07 Apr 2020 08:09) (129/73 - 182/93)  BP(mean): --  RR: 16 (07 Apr 2020 08:09) (16 - 18)  SpO2: 95% (07 Apr 2020 08:09) (95% - 98%)            Consultant(s) Notes Reviewed:  [x ] YES  [ ] NO          MEDICATIONS  (STANDING):  amLODIPine   Tablet 5 milliGRAM(s) Oral daily  atorvastatin Oral Tab/Cap - Peds 40 milliGRAM(s) Oral daily  budesonide 160 MICROgram(s)/formoterol 4.5 MICROgram(s) Inhaler 2 Puff(s) Inhalation two times a day  heparin  Injectable 5000 Unit(s) SubCutaneous every 8 hours  hydroxychloroquine   Oral   hydroxychloroquine 200 milliGRAM(s) Oral every 12 hours  latanoprost 0.005% Ophthalmic Solution 1 Drop(s) Both EYES at bedtime  metoprolol succinate ER 50 milliGRAM(s) Oral daily  montelukast 10 milliGRAM(s) Oral daily  pantoprazole    Tablet 40 milliGRAM(s) Oral before breakfast  torsemide 20 milliGRAM(s) Oral daily    MEDICATIONS  (PRN):  acetaminophen   Tablet .. 650 milliGRAM(s) Oral every 6 hours PRN Temp greater or equal to 38C (100.4F)      LABS                          12.6   4.05  )-----------( 148      ( 07 Apr 2020 07:10 )             38.3     04-07    145  |  110  |  22<H>  ----------------------------<  102<H>  4.4   |  22  |  1.2    Ca    8.2<L>      07 Apr 2020 07:10  Mg     2.3     04-07    TPro  5.7<L>  /  Alb  3.5  /  TBili  0.5  /  DBili  x   /  AST  25  /  ALT  17  /  AlkPhos  82  04-07          Lactate Trend  04-04 @ 20:50 Lactate:1.8         CAPILLARY BLOOD GLUCOSE          Culture - Urine (collected 04-04-20 @ 23:34)  Source: .Urine Clean Catch (Midstream)  Final Report (04-06-20 @ 08:59):    No growth        RADIOLOGY & ADDITIONAL TESTS:    Imaging Personally Reviewed:  [ ] YES  [ ] NO    HEALTH ISSUES - PROBLEM Dx:        PHYSICAL EXAM:  GENERAL: NAD, well-developed  HEAD:  Atraumatic, Normocephalic  EYES: EOMI, PERRLA, conjunctiva and sclera clear  NECK: Supple, No JVD  CHEST/LUNG: bibasilar rales.   HEART: Regular rate and rhythm; S1 S2  ABDOMEN: Soft, Nontender, Nondistended; Bowel sounds present  EXTREMITIES:  2+ Peripheral Pulses, No clubbing, cyanosis, or edema  PSYCH: AAOx3  NEUROLOGY: non-focal

## 2020-04-08 LAB
CRP SERPL-MCNC: 9.27 MG/DL — HIGH (ref 0–0.4)
PROCALCITONIN SERPL-MCNC: 0.07 NG/ML — SIGNIFICANT CHANGE UP (ref 0.02–0.1)

## 2020-04-13 DIAGNOSIS — K21.9 GASTRO-ESOPHAGEAL REFLUX DISEASE WITHOUT ESOPHAGITIS: ICD-10-CM

## 2020-04-13 DIAGNOSIS — U07.1 COVID-19: ICD-10-CM

## 2020-04-13 DIAGNOSIS — J12.89 OTHER VIRAL PNEUMONIA: ICD-10-CM

## 2020-04-13 DIAGNOSIS — Z87.891 PERSONAL HISTORY OF NICOTINE DEPENDENCE: ICD-10-CM

## 2020-04-13 DIAGNOSIS — Z66 DO NOT RESUSCITATE: ICD-10-CM

## 2020-04-13 DIAGNOSIS — I10 ESSENTIAL (PRIMARY) HYPERTENSION: ICD-10-CM

## 2020-04-13 DIAGNOSIS — E78.5 HYPERLIPIDEMIA, UNSPECIFIED: ICD-10-CM

## 2020-04-13 DIAGNOSIS — N17.9 ACUTE KIDNEY FAILURE, UNSPECIFIED: ICD-10-CM

## 2020-04-13 DIAGNOSIS — E86.0 DEHYDRATION: ICD-10-CM

## 2020-04-13 DIAGNOSIS — J44.0 CHRONIC OBSTRUCTIVE PULMONARY DISEASE WITH (ACUTE) LOWER RESPIRATORY INFECTION: ICD-10-CM

## 2020-04-13 DIAGNOSIS — H40.9 UNSPECIFIED GLAUCOMA: ICD-10-CM

## 2023-09-29 NOTE — ED ADULT NURSE NOTE - NS PRO PASSIVE SMOKE EXP
Procedure:  COLONOSCOPY    Relevant Problems   CARDIO   (+) PVC (premature ventricular contraction)      MUSCULOSKELETAL   (+) Chronic bilateral low back pain without sciatica      NEURO/PSYCH   (+) Chronic bilateral low back pain without sciatica   (+) Depression with suicidal ideation   (+) Major depressive disorder, recurrent episode, moderate (HCC)   (+) Tension type headache      PULMONARY   (+) CEHVY (obstructive sleep apnea)      Sinus rhythm with occasional Premature ventricular complexes  Low voltage QRS  Cannot rule out Anterior infarct , age undetermined  Abnormal ECG      Physical Exam    Airway    Mallampati score: II  TM Distance: >3 FB  Neck ROM: full     Dental   No notable dental hx     Cardiovascular  Cardiovascular exam normal    Pulmonary  Pulmonary exam normal     Other Findings        Anesthesia Plan  ASA Score- 2     Anesthesia Type- IV sedation with anesthesia with ASA Monitors. Additional Monitors:   Airway Plan:           Plan Factors-Exercise tolerance (METS): >4 METS. Chart reviewed. EKG reviewed. Imaging results reviewed. Existing labs reviewed. Patient summary reviewed. Patient is not a current smoker. Patient not instructed to abstain from smoking on day of procedure. Patient did not smoke on day of surgery. Obstructive sleep apnea risk education given perioperatively. Induction- intravenous. Postoperative Plan-     Informed Consent- Anesthetic plan and risks discussed with patient. I personally reviewed this patient with the CRNA. Discussed and agreed on the Anesthesia Plan with the CRNA. Damari Leal
No

## 2024-09-11 NOTE — ED ADULT NURSE NOTE - NS ED NURSE LEVEL OF CONSCIOUSNESS SPEECH
The prior authorization forms can be found on the [employer] Portal or you can contact the pharmacy to have them fax a blank copy to you.      Thank you!   Speaking Coherently